# Patient Record
Sex: MALE | Race: WHITE | NOT HISPANIC OR LATINO | Employment: FULL TIME | URBAN - NONMETROPOLITAN AREA
[De-identification: names, ages, dates, MRNs, and addresses within clinical notes are randomized per-mention and may not be internally consistent; named-entity substitution may affect disease eponyms.]

---

## 2018-03-29 ENCOUNTER — OFFICE VISIT CONVERTED (OUTPATIENT)
Dept: FAMILY MEDICINE CLINIC | Age: 53
End: 2018-03-29
Attending: FAMILY MEDICINE

## 2018-08-16 ENCOUNTER — OFFICE VISIT CONVERTED (OUTPATIENT)
Dept: FAMILY MEDICINE CLINIC | Age: 53
End: 2018-08-16
Attending: FAMILY MEDICINE

## 2019-09-24 ENCOUNTER — OFFICE VISIT CONVERTED (OUTPATIENT)
Dept: FAMILY MEDICINE CLINIC | Age: 54
End: 2019-09-24
Attending: FAMILY MEDICINE

## 2019-09-26 ENCOUNTER — HOSPITAL ENCOUNTER (OUTPATIENT)
Dept: OTHER | Facility: HOSPITAL | Age: 54
Discharge: HOME | End: 2019-09-26
Attending: FAMILY MEDICINE

## 2019-09-26 LAB
ALBUMIN SERPL-MCNC: 4.5 G/DL (ref 3.5–5)
ALBUMIN/GLOB SERPL: 1.7 {RATIO} (ref 1.4–2.6)
ALP SERPL-CCNC: 56 U/L (ref 56–119)
ALT SERPL-CCNC: 47 U/L (ref 10–40)
ANION GAP SERPL CALC-SCNC: 23 MMOL/L (ref 8–19)
AST SERPL-CCNC: 39 U/L (ref 15–50)
BASOPHILS # BLD MANUAL: 0.01 10*3/UL (ref 0–0.2)
BASOPHILS NFR BLD MANUAL: 0.2 % (ref 0–3)
BILIRUB SERPL-MCNC: 0.56 MG/DL (ref 0.2–1.3)
BUN SERPL-MCNC: 20 MG/DL (ref 5–25)
BUN/CREAT SERPL: 22 {RATIO} (ref 6–20)
CALCIUM SERPL-MCNC: 10 MG/DL (ref 8.7–10.4)
CHLORIDE SERPL-SCNC: 96 MMOL/L (ref 99–111)
CHOLEST SERPL-MCNC: 174 MG/DL (ref 107–200)
CHOLEST/HDLC SERPL: 4.6 {RATIO} (ref 3–6)
CONV CO2: 22 MMOL/L (ref 22–32)
CONV TOTAL PROTEIN: 7.2 G/DL (ref 6.3–8.2)
CREAT UR-MCNC: 0.92 MG/DL (ref 0.7–1.2)
DEPRECATED RDW RBC AUTO: 41 FL
EOSINOPHIL # BLD MANUAL: 0.32 10*3/UL (ref 0–0.7)
EOSINOPHIL NFR BLD MANUAL: 5.1 % (ref 0–7)
ERYTHROCYTE [DISTWIDTH] IN BLOOD BY AUTOMATED COUNT: 12.5 % (ref 11.5–14.5)
GFR SERPLBLD BASED ON 1.73 SQ M-ARVRAT: >60 ML/MIN/{1.73_M2}
GLOBULIN UR ELPH-MCNC: 2.7 G/DL (ref 2–3.5)
GLUCOSE SERPL-MCNC: 94 MG/DL (ref 70–99)
GRANS (ABSOLUTE): 3.25 10*3/UL (ref 2–8)
GRANS: 51.8 % (ref 30–85)
HBA1C MFR BLD: 14.5 G/DL (ref 14–18)
HCT VFR BLD AUTO: 42.2 % (ref 42–52)
HDLC SERPL-MCNC: 38 MG/DL (ref 40–60)
IMM GRANULOCYTES # BLD: 0.01 10*3/UL (ref 0–0.54)
IMM GRANULOCYTES NFR BLD: 0.2 % (ref 0–0.43)
LDLC SERPL CALC-MCNC: 110 MG/DL (ref 70–100)
LYMPHOCYTES # BLD MANUAL: 2.14 10*3/UL (ref 1–5)
LYMPHOCYTES NFR BLD MANUAL: 8.5 % (ref 3–10)
MCH RBC QN AUTO: 30.5 PG (ref 27–31)
MCHC RBC AUTO-ENTMCNC: 34.4 G/DL (ref 33–37)
MCV RBC AUTO: 88.7 FL (ref 80–96)
MONOCYTES # BLD AUTO: 0.53 10*3/UL (ref 0.2–1.2)
OSMOLALITY SERPL CALC.SUM OF ELEC: 286 MOSM/KG (ref 273–304)
PLATELET # BLD AUTO: 228 10*3/UL (ref 130–400)
PMV BLD AUTO: 9.7 FL (ref 7.4–10.4)
POTASSIUM SERPL-SCNC: 4.4 MMOL/L (ref 3.5–5.3)
PSA SERPL-MCNC: 0.36 NG/ML (ref 0–4)
RBC # BLD AUTO: 4.76 10*6/UL (ref 4.7–6.1)
SODIUM SERPL-SCNC: 137 MMOL/L (ref 135–147)
TRIGL SERPL-MCNC: 131 MG/DL (ref 40–150)
TSH SERPL-ACNC: 2.2 M[IU]/L (ref 0.27–4.2)
VARIANT LYMPHS NFR BLD MANUAL: 34.2 % (ref 20–45)
VLDLC SERPL-MCNC: 26 MG/DL (ref 5–37)
WBC # BLD AUTO: 6.26 10*3/UL (ref 4.8–10.8)

## 2019-09-27 LAB
CONV HEPATITIS C AB WITH REFLEX TO CONFIRMATION: <0.1 S/CO RATIO (ref 0–0.9)
CONV HEPATITIS COMMENT: NORMAL

## 2019-11-23 ENCOUNTER — OFFICE VISIT CONVERTED (OUTPATIENT)
Dept: FAMILY MEDICINE CLINIC | Age: 54
End: 2019-11-23
Attending: FAMILY MEDICINE

## 2019-12-04 ENCOUNTER — OFFICE VISIT CONVERTED (OUTPATIENT)
Dept: FAMILY MEDICINE CLINIC | Age: 54
End: 2019-12-04
Attending: FAMILY MEDICINE

## 2020-02-22 ENCOUNTER — OFFICE VISIT CONVERTED (OUTPATIENT)
Dept: FAMILY MEDICINE CLINIC | Age: 55
End: 2020-02-22
Attending: FAMILY MEDICINE

## 2020-02-22 ENCOUNTER — HOSPITAL ENCOUNTER (OUTPATIENT)
Dept: OTHER | Facility: HOSPITAL | Age: 55
Discharge: HOME | End: 2020-02-22
Attending: FAMILY MEDICINE

## 2020-02-22 LAB
APPEARANCE UR: CLEAR
BACTERIA UR QL AUTO: ABNORMAL
BILIRUB UR QL: NEGATIVE
CASTS URNS QL MICRO: ABNORMAL /[LPF]
COLOR UR: YELLOW
CONV LEUKOCYTE ESTERASE: NEGATIVE
CONV UROBILINOGEN IN URINE BY AUTOMATED TEST STRIP: 0.2 {EHRLICHU}/DL (ref 0.1–1)
EPI CELLS #/AREA URNS HPF: ABNORMAL /[HPF]
GLUCOSE 24H UR-MCNC: NEGATIVE MG/DL
HGB UR QL STRIP: ABNORMAL
KETONES UR QL STRIP: NEGATIVE MG/DL
MUCOUS THREADS URNS QL MICRO: ABNORMAL
NITRITE UR-MCNC: NEGATIVE MG/ML
PH UR STRIP.AUTO: 6 [PH] (ref 5–8)
PROT UR-MCNC: NEGATIVE MG/DL
RBC # BLD AUTO: ABNORMAL /[HPF]
SP GR UR STRIP: 1.02 (ref 1–1.03)
SPECIMEN SOURCE: ABNORMAL
UNIDENT CRYS URNS QL MICRO: ABNORMAL /[HPF]
WBC #/AREA URNS HPF: ABNORMAL /[HPF]

## 2020-09-30 ENCOUNTER — OFFICE VISIT CONVERTED (OUTPATIENT)
Dept: FAMILY MEDICINE CLINIC | Age: 55
End: 2020-09-30
Attending: FAMILY MEDICINE

## 2020-09-30 ENCOUNTER — HOSPITAL ENCOUNTER (OUTPATIENT)
Dept: OTHER | Facility: HOSPITAL | Age: 55
Discharge: HOME | End: 2020-09-30
Attending: FAMILY MEDICINE

## 2020-09-30 LAB
ALBUMIN SERPL-MCNC: 4.9 G/DL (ref 3.5–5)
ALBUMIN/GLOB SERPL: 1.6 {RATIO} (ref 1.4–2.6)
ALP SERPL-CCNC: 57 U/L (ref 56–119)
ALT SERPL-CCNC: 70 U/L (ref 10–40)
ANION GAP SERPL CALC-SCNC: 18 MMOL/L (ref 8–19)
AST SERPL-CCNC: 45 U/L (ref 15–50)
BILIRUB SERPL-MCNC: 0.67 MG/DL (ref 0.2–1.3)
BUN SERPL-MCNC: 14 MG/DL (ref 5–25)
BUN/CREAT SERPL: 16 {RATIO} (ref 6–20)
CALCIUM SERPL-MCNC: 9.7 MG/DL (ref 8.7–10.4)
CHLORIDE SERPL-SCNC: 99 MMOL/L (ref 99–111)
CHOLEST SERPL-MCNC: 196 MG/DL (ref 107–200)
CHOLEST/HDLC SERPL: 4.7 {RATIO} (ref 3–6)
CONV CO2: 25 MMOL/L (ref 22–32)
CONV TOTAL PROTEIN: 7.9 G/DL (ref 6.3–8.2)
CREAT UR-MCNC: 0.89 MG/DL (ref 0.7–1.2)
ERYTHROCYTE [DISTWIDTH] IN BLOOD BY AUTOMATED COUNT: 12 % (ref 11.6–14.4)
GFR SERPLBLD BASED ON 1.73 SQ M-ARVRAT: >60 ML/MIN/{1.73_M2}
GLOBULIN UR ELPH-MCNC: 3 G/DL (ref 2–3.5)
GLUCOSE SERPL-MCNC: 113 MG/DL (ref 70–99)
HCT VFR BLD AUTO: 50.3 % (ref 42–52)
HDLC SERPL-MCNC: 42 MG/DL (ref 40–60)
HGB BLD-MCNC: 16.9 G/DL (ref 14–18)
LDLC SERPL CALC-MCNC: 130 MG/DL (ref 70–100)
MCH RBC QN AUTO: 30.8 PG (ref 27–31)
MCHC RBC AUTO-ENTMCNC: 33.6 G/DL (ref 33–37)
MCV RBC AUTO: 91.6 FL (ref 80–96)
OSMOLALITY SERPL CALC.SUM OF ELEC: 287 MOSM/KG (ref 273–304)
PLATELET # BLD AUTO: 173 10*3/UL (ref 130–400)
PMV BLD AUTO: 10.9 FL (ref 9.4–12.4)
POTASSIUM SERPL-SCNC: 4.4 MMOL/L (ref 3.5–5.3)
PSA SERPL-MCNC: 0.34 NG/ML (ref 0–4)
RBC # BLD AUTO: 5.49 10*6/UL (ref 4.7–6.1)
SODIUM SERPL-SCNC: 138 MMOL/L (ref 135–147)
TRIGL SERPL-MCNC: 119 MG/DL (ref 40–150)
TSH SERPL-ACNC: 1.33 M[IU]/L (ref 0.27–4.2)
VLDLC SERPL-MCNC: 24 MG/DL (ref 5–37)
WBC # BLD AUTO: 6.08 10*3/UL (ref 4.8–10.8)

## 2021-05-18 NOTE — PROGRESS NOTES
Dinesh Thornton  1965     Office/Outpatient Visit    Visit Date: Sat, Nov 23, 2019 12:05 pm    Provider: Pepe Aguilar MD (Assistant: Shirley Serna LPN)    Location: [facility]        Electronically signed by Pepe Aguilar MD on  11/24/2019 08:46:59 PM                             Subjective:        CC: Mr. Thornton is a 54 year old White male.  pt is here today for depression issues.          HPI:           Mr. Thornton presents with encounter for screening for depression.  The diagnosis of depression was made several weeks ago.  Presently, he feels a moderate degree of depression.  Currently not on any antidepressants.  Current affective symptoms include anhedonia, anxious mood, a change in appetite, altered sleep habits and decreased ability to concentrate.  The symptoms are constant and overwhelming.  Presently, Mr. Thornton denies suicidal ideation.  Recent stressors include SON MOVED AWAY FROM  HOME.      ROS:     CONSTITUTIONAL:  Negative for chills and fever.      CARDIOVASCULAR:  Negative for chest pain, palpitations and pedal edema.      RESPIRATORY:  Positive for SNORES, NEG SLEEP STUDY.   Negative for recent cough or dyspnea.      GASTROINTESTINAL:  Negative for abdominal pain, anorexia, constipation, diarrhea, nausea and vomiting.          Past Medical History / Family History / Social History:         Last Reviewed on 11/24/2019 08:42 PM by Pepe Aguilar    Past Medical History:             PREVENTIVE HEALTH MAINTENANCE             COLORECTAL CANCER SCREENING: Up to date (colonoscopy q10y; sigmoidoscopy q5y; Cologuard q3y) was last done 2014, Results are in chart; colonoscopy with normal results     DENTAL CLEANING: was last done 2019     EYE EXAM: was last done 2019     Hepatitis C Medicare Screening: was last done 2019     PSA: was last done 2018 with normal results         PAST MEDICAL HISTORY             CURRENT MEDICAL PROVIDERS:    Dermatologist         Surgical  History:         Vasectomy     R KNEE ARTHROSCOPY;         Family History:         Positive for Hypertension.      Positive for Type 2 Diabetes.          Social History:     Occupation: [employer];     Marital Status:      Children: 2 children         Tobacco/Alcohol/Supplements:     Last Reviewed on 11/24/2019 08:42 PM by Pepe Aguilar    Tobacco: He has never smoked.          Alcohol: Frequency: Daily When he drinks, the average quantity of alcohol is ONE drinks.   He typically consumes beer.      Caffeine:  He admits to consuming caffeine via coffee ( 1 serving per day ) and tea ( 1 serving per day ).          Substance Abuse History:     Last Reviewed on 11/24/2019 08:42 PM by Pepe Aguilar    NEGATIVE         Current Problems:     Last Reviewed on 11/24/2019 08:42 PM by Pepe Aguilar    Essential (primary) hypertension    HTN    Screening for ischemic heart disease    Encounter for screening for malignant neoplasm of prostate    Encounter for screening for cardiovascular disorders    Screening for prostate cancer    Screening for depression    Encounter for screening for other disorder    Rosacea    Encounter for screening for depression    Dysthymic disorder        Immunizations:     Fluvirin (4 + years dose) 10/10/2016    Fluzone (3 + years dose) 10/17/2017    Influenza Virus Vaccine, unspecified formulation 10/1/2018    Influenza, injectable, MDCK, preservative free 10/11/2019        Allergies:     Last Reviewed on 11/24/2019 08:42 PM by Pepe Aguilar    No Known Allergies.        Current Medications:     Last Reviewed on 11/24/2019 08:42 PM by Pepe Aguilar    Lisinopril 20 mg oral tablet [1 tab daily]    Atenolol 25 mg oral tablet [1 tab daily PRN]    Metronidazole 1 % Topical Cream        Objective:        Vitals:         Current: 11/23/2019 12:10:26 PM    Ht:  6 ft, 5.25 in;  Wt: 195.2 lbs;  BMI: 23.0T: 98.9 F (oral);  BP: 117/73 mm Hg (left  arm, sitting);  P: 73 bpm (left arm (BP Cuff), sitting);  sCr: 0.92 mg/dL;  GFR: 97.59        Exams:     PHYSICAL EXAM:     GENERAL: Vitals recorded well developed, well nourished;  well groomed;  no apparent distress;     NEUROLOGIC: GROSSLY INTACT     PSYCHIATRIC:  appropriate affect and demeanor; normal speech pattern; grossly normal memory;         Assessment:         F34.1   Dysthymic disorder       Z13.31   Encounter for screening for depression           ORDERS:         Meds Prescribed:       [New Rx] citalopram 20 mg oral tablet [take 1 tablet (20 mg) by oral route once daily], #30 (thirty) tablets, Refills: 2 (two)         Other Orders:         Depression screen positive and follow up plan documented  (In-House)                      Plan:         Dysthymic disorder        MEDICATIONS: NEW prescriptions an antidepressant     FOLLOW-UP: BY PHONE IN A COOUPLE OF WEEKS     DECLINES COUNSELING AT THIS TIME MIPS Positive Depression Screen: Suicide Risk Assessment completed--denies suicidal/homicidal ideation; Pharmacologic intervention initiated/modified           Prescriptions:       [New Rx] citalopram 20 mg oral tablet [take 1 tablet (20 mg) by oral route once daily], #30 (thirty) tablets, Refills: 2 (two)           Orders:         Depression screen positive and follow up plan documented  (In-House)                  Charge Capture:         Primary Diagnosis:     F34.1  Dysthymic disorder           Orders:      35276  Office/outpatient visit; established patient, level 3  (In-House)              Depression screen positive and follow up plan documented  (In-House)              Z13.31  Encounter for screening for depression

## 2021-05-18 NOTE — PROGRESS NOTES
Dinesh Thornton  1965     Office/Outpatient Visit    Visit Date: Sat, Feb 22, 2020 08:33 am    Provider: Pepe Aguilar MD (Assistant: Daly Castaneda MA)    Location: [facility]        Electronically signed by Pepe Aguilar MD on  02/22/2020 10:32:31 AM                             Subjective:        CC: Mr. Thornton is a 54 year old White male.  dysuria PT STATES HE ISNT TAKING XANAX OR SERTRALINE         HPI:           Mr. Thornton presents with dysuria.  This was first noted 1 month ago.  He denies associated decreased force of urine stream, dribbling or intermittent urine stream, hematuria, hesitancy, urgency and discharge.  Mr. Thornton states this is the first time he has experienced this kind of discomfort.        (Improving)     Concerning dysthymic disorder, the diagnosis of depression was made several weeks ago.  Presently, he feels a moderate degree of depression.  Currently not on any antidepressants.  Currently, he is doing well without any significant affective symptoms.  Recent stressors include SON MOVED AWAY FROM  HOME.  NOW OFF MEDS AND FEELS OK AT THIS TIME.      ROS:     CONSTITUTIONAL:  Negative for chills and fever.      GASTROINTESTINAL:  Negative for abdominal pain, nausea and vomiting.      MUSCULOSKELETAL:  Negative for back pain and myalgias.      NEUROLOGICAL:  Negative for headaches.          Past Medical History / Family History / Social History:         Last Reviewed on 2/22/2020 08:46 AM by Pepe Aguilar    Past Medical History:             PREVENTIVE HEALTH MAINTENANCE             COLORECTAL CANCER SCREENING: Up to date (colonoscopy q10y; sigmoidoscopy q5y; Cologuard q3y) was last done 2014, Results are in chart; colonoscopy with normal results     DENTAL CLEANING: was last done 2019     EYE EXAM: was last done 2019     Hepatitis C Medicare Screening: was last done 2019     PSA: was last done 2019 with normal results         PAST MEDICAL HISTORY              CURRENT MEDICAL PROVIDERS:    Dermatologist         Surgical History:         Vasectomy     R KNEE ARTHROSCOPY;         Family History:         Positive for Hypertension.      Positive for Type 2 Diabetes.          Social History:     Occupation: [employer];     Marital Status:      Children: 2 children         Tobacco/Alcohol/Supplements:     Last Reviewed on 2/22/2020 08:46 AM by Pepe Aguilar    Tobacco: He has never smoked.          Alcohol: Frequency: Daily When he drinks, the average quantity of alcohol is ONE drinks.   He typically consumes beer.      Caffeine:  He admits to consuming caffeine via coffee ( 1 serving per day ) and tea ( 1 serving per day ).          Substance Abuse History:     Last Reviewed on 2/22/2020 08:46 AM by Pepe Aguilar    NEGATIVE         Current Problems:     Last Reviewed on 2/22/2020 08:46 AM by Pepe Aguilar    Essential (primary) hypertension    Rosacea    Dysthymic disorder        Immunizations:     Influenza, injectable, MDCK, preservative free 10/11/2019    Fluvirin (4 + years dose) 10/10/2016    Fluzone (3 + years dose) 10/17/2017    Influenza Virus Vaccine, unspecified formulation 10/1/2018        Allergies:     Last Reviewed on 2/22/2020 08:46 AM by Pepe Aguilar    No Known Allergies.        Current Medications:     Last Reviewed on 2/22/2020 08:46 AM by Pepe Aguilar    Xanax 0.5 mg oral tablet [take 1 tablet (0.5 mg) by oral route 3 times per day PRN]    Lisinopril 20 mg oral tablet [1 tab daily]    Atenolol 25 mg oral tablet [1 tab daily PRN]    Metronidazole 1 % Topical Cream    sertraline 50 mg oral tablet [take 1 tablet (50 mg) by oral route twice daily]        Objective:        Vitals:         Current: 2/22/2020 8:36:52 AM    Ht:  6 ft, 5.25 in;  Wt: 197 lbs;  BMI: 23.2T: 97.7 F (oral);  BP: 139/89 mm Hg (left arm, sitting);  P: 71 bpm (left arm (BP Cuff), sitting);  sCr: 0.92 mg/dL;  GFR: 97.97         Exams:     PHYSICAL EXAM:     GENERAL: Vitals recorded well developed, well nourished;  well groomed;  no apparent distress;     GENITOURINARY: prostate: minimal tenderness; no prostatic nodules; S/W BOGGY;     NEUROLOGIC: GROSSLY INTACT     PSYCHIATRIC:  appropriate affect and demeanor; normal speech pattern; grossly normal memory;         Lab/Test Results:         LABORATORY RESULTS:     U/A IS CLEAR;         Assessment:         N41.0   Acute prostatitis   PROBABLE     F34.1   Dysthymic disorder   (Improving)         ORDERS:         Meds Prescribed:       [New Rx] Bactrim -160 mg oral tablet [take 1 tablet by oral route BID], #30 (thirty) tablets, Refills: 0 (zero)         Lab Orders:       [id number]  BDUAM - [facility] Urinalysis, automated, with micro  (Send-Out)                      Plan:         Acute prostatitis    LABORATORY:  Labs ordered to be performed today include urinalysis with micro.      FOLLOW-UP: Schedule follow-up appointments on a p.r.n. basis.  Observe for now Consider further workup           Prescriptions:       [New Rx] Bactrim -160 mg oral tablet [take 1 tablet by oral route BID], #30 (thirty) tablets, Refills: 0 (zero)           Orders:       [id number]  BDUAM - [facility] Urinalysis, automated, with micro  (Send-Out)              Dysthymic disorder    Observe as improved             Patient Recommendations:        For  Acute prostatitis:    Schedule follow-up appointments as needed.              Charge Capture:         Primary Diagnosis:     N41.0  Acute prostatitis           Orders:      [id number]  Office/outpatient visit; established patient, level 3  (In-House)              F34.1  Dysthymic disorder

## 2021-05-18 NOTE — PROGRESS NOTES
Dinesh Thornton  1965     Office/Outpatient Visit    Visit Date: Wed, Sep 30, 2020 08:30 am    Provider: Pepe Aguilar MD (Assistant: Angela Burton MA)    Location: [facility]        Electronically signed by Pepe Aguilar MD on  09/30/2020 09:59:04 AM                             Subjective:        CC: Mr. Thornton is a 55 year old White male.  This is a follow-up visit.  annual physical wait on flu vaccine        HPI:           Encounter for general adult medical examination without abnormal findings noted.  His last physical exam was 1 year ago.  He underwent colonoscopy 6 years ago.      Smoking Status:  Nonsmoker           PHQ-9 Depression Screening: Completed form scanned and in chart; Total Score 0     ROS:     CONSTITUTIONAL:  Negative for chills and fever.      EYES:  Negative for blurred vision and eye drainage.      E/N/T:  Negative for ear pain, diminished hearing, nasal congestion and sore throat.      CARDIOVASCULAR:  Negative for chest pain, palpitations and pedal edema.      RESPIRATORY:  Positive for SNORES, NEG SLEEP STUDY.   Negative for recent cough or dyspnea.      GASTROINTESTINAL:  Negative for abdominal pain, anorexia, constipation, diarrhea, nausea and vomiting.      GENITOURINARY:  Negative for dysuria, hematuria and impotence.      MUSCULOSKELETAL:  Negative for myalgias.      NEUROLOGICAL:  Negative for headaches.          Past Medical History / Family History / Social History:         Last Reviewed on 9/30/2020 08:38 AM by Pepe Aguilar    Past Medical History:             PREVENTIVE HEALTH MAINTENANCE             COLORECTAL CANCER SCREENING: Up to date (colonoscopy q10y; sigmoidoscopy q5y; Cologuard q3y) was last done 2014, Results are in chart; colonoscopy with normal results     DENTAL CLEANING: was last done 2019     EYE EXAM: was last done 2019     Hepatitis C Medicare Screening: was last done 2019     PSA: was last done SEPT-2019 with  normal results         PAST MEDICAL HISTORY             CURRENT MEDICAL PROVIDERS:    Dermatologist         Surgical History:         Vasectomy     R KNEE ARTHROSCOPY;         Family History:         Positive for Hypertension.      Positive for Type 2 Diabetes.          Social History:     Occupation: [employer];     Marital Status:      Children: 2 children         Tobacco/Alcohol/Supplements:     Last Reviewed on 9/30/2020 08:38 AM by Pepe Aguilar    Tobacco: He has never smoked.          Alcohol: Frequency: Daily When he drinks, the average quantity of alcohol is ONE drinks.   He typically consumes beer.      Caffeine:  He admits to consuming caffeine via coffee ( 1 serving per day ) and tea ( 1 serving per day ).          Substance Abuse History:     Last Reviewed on 9/30/2020 08:38 AM by Pepe Aguilar    NEGATIVE         Current Problems:     Last Reviewed on 9/30/2020 08:38 AM by Pepe Aguilar    Essential (primary) hypertension    Rosacea    Encounter for general adult medical examination without abnormal findings        Immunizations:     Influenza, injectable, MDCK, preservative free 10/11/2019    Fluvirin (4 + years dose) 10/10/2016    Fluzone (3 + years dose) 10/17/2017    Influenza Virus Vaccine, unspecified formulation 10/1/2018        Allergies:     Last Reviewed on 9/30/2020 08:38 AM by Pepe Aguilar    No Known Allergies.        Current Medications:     Last Reviewed on 9/30/2020 08:38 AM by Pepe Aguilar    lisinopriL 20 mg oral tablet [TAKE 1 TABLET PO QD]    Atenolol 25 mg oral tablet [1 tab daily PRN]    Metronidazole 1 % Topical Cream        Objective:        Vitals:         Current: 9/30/2020 8:36:25 AM    Ht:  6 ft, 5.25 in;  Wt: 200.4 lbs;  BMI: 23.6T: 96.6 F (temporal);  BP: 134/84 mm Hg (left arm, sitting);  P: 75 bpm (left arm (BP Cuff), sitting);  sCr: 0.92 mg/dL;  GFR: 97.56        Exams:     PHYSICAL EXAM:     GENERAL:  Vitals recorded well developed, well nourished;  well groomed;  no apparent distress;     EYES: conjunctiva and cornea are normal;     E/N/T:  normal EACs, TMs, nasal/oral mucosa, teeth, gingiva, and oropharynx;     NECK: trachea is midline; thyroid is non-palpable;     RESPIRATORY: normal respiratory rate and pattern with no distress; normal breath sounds with no rales, rhonchi, wheezes or rubs;     CARDIOVASCULAR: normal rate; rhythm is regular;  no systolic murmur; no cyanosis; no edema;     GASTROINTESTINAL: nontender, nondistended; no hepatosplenomegaly or masses; no bruits;     LYMPHATIC: no enlargement of cervical or facial nodes; no supraclavicular nodes;     MUSCULOSKELETAL: normal gait; normal overall tone     NEUROLOGIC: GROSSLY INTACT     PSYCHIATRIC:  appropriate affect and demeanor; normal speech pattern; grossly normal memory;         Assessment:         Z00.00   Encounter for general adult medical examination without abnormal findings       I10   Essential (primary) hypertension       Z13.220   Encounter for screening for lipoid disorders       Z12.5   Encounter for screening for malignant neoplasm of prostate       Z23   Encounter for immunization       Z13.31   Encounter for screening for depression           ORDERS:         Radiology/Test Orders:       3017F  Colorectal CA screen results documented and reviewed (PV)  (In-House)              Lab Orders:       [id number]  BDCB2 - [facility] CBC w/o diff  (Send-Out)            71951  COMP [facility] Comp. Metabolic Panel  (Send-Out)            49343  TSH - [facility] TSH  (Send-Out)            *  PRSAS Medicare screening PSA  (Send-Out)            23516  LPDP - [facility] Lipid Panel  (Send-Out)              Procedures Ordered:       [id number]  Immunization administration; one vaccine  (In-House)              Other Orders:       [id number]  Influenza virus vaccine, quadrivalent, split virus, preservative free 3 years of age & older  (In-House)              Depression screen  negative  (In-House)                      Plan:         Encounter for general adult medical examination without abnormal findings        COUNSELING was provided today regarding the following topics: healthy eating habits, regular exercise, testicular self-exam, use of seat belts, and ADVISED TO SEE AN EYE DOCTOR AND A DENTIST REGULARLY.      FOLLOW-UP: Schedule a follow-up visit in 12 months.  MIPS Negative Depression Screen           Orders:         Depression screen negative  (In-House)            3017F  Colorectal CA screen results documented and reviewed (PV)  (In-House)              Essential (primary) hypertension    LABORATORY:  Labs ordered to be performed today include CBC W/O DIFF, Comprehensive metabolic panel, and TSH.            Orders:       75109  BDCB2 - [facility] CBC w/o diff  (Send-Out)            07583  COMP - [facility] Comp. Metabolic Panel  (Send-Out)            77854  TSH - [facility] TSH  (Send-Out)              Encounter for screening for lipoid disorders    LABORATORY:  Labs ordered to be performed today include lipid panel.            Orders:       [id number]  LPDP - [facility] Lipid Panel  (Send-Out)              Encounter for screening for malignant neoplasm of prostate    LABORATORY:  Labs ordered to be performed today include PSA.            Orders:       *  PRSAS Medicare screening PSA  (Send-Out)              Encounter for immunization        IMMUNIZATIONS given today: Influenza Quadrivalent psv free shot 3 & up.            Immunizations:       99202  Influenza virus vaccine, quadrivalent, split virus, preservative free 3 years of age & older  (In-House)                Dose (ml): 0.5  Site: left deltoid  Route: intramuscular  Administered by: Lauren Govea          : [employer]  Lot #: [id number]  Exp: 06/30/2021          NDC: [address]-73      89691  Immunization administration; one vaccine  (In-House)                  Patient Recommendations:        For  Encounter for general adult  medical examination without abnormal findings:    Limit dietary intake of fat (especially saturated fat) and cholesterol.  Eat a variety of foods, including plenty of fruits, vegetables, and grain containg fiber, limit fat intake to 30% of total calories. Balance caloric intake with energy expended. Maintaining regular physical activity is advised to help prevent heart disease, hypertension, diabetes, and obesity.    Testicular cancer is the #1 cancer in men ages 15-35.  You should regularly examine your testicles for knots, lumps, or tenderness. Testicular pain, even if not associated with any masses, needs to be evaluated by your doctor! Always use shoulder/lap restraints when driving or riding in a vehicle, even those equipped with air bags.  Schedule a follow-up visit in 12 months.              Charge Capture:         Primary Diagnosis:     Z00.00  Encounter for general adult medical examination without abnormal findings           Orders:      84349  Preventive medicine, established patient, age 40-64 years  (In-House)              Depression screen negative  (In-House)            3017F  Colorectal CA screen results documented and reviewed (PV)  (In-House)              I10  Essential (primary) hypertension     Z13.220  Encounter for screening for lipoid disorders     Z12.5  Encounter for screening for malignant neoplasm of prostate     Z23  Encounter for immunization           Orders:      21431  Influenza virus vaccine, quadrivalent, split virus, preservative free 3 years of age & older  (In-House)            19595  Immunization administration; one vaccine  (In-House)              Z13.31  Encounter for screening for depression

## 2021-05-18 NOTE — PROGRESS NOTES
Dinesh Thornton 1965     Office/Outpatient Visit    Visit Date: Tue, Sep 24, 2019 03:33 pm    Provider: Pepe Aguilar MD (Assistant: Lauren Govea MA)    Location: [facility]        Electronically signed by Pepe Aguilar MD on  09/24/2019 05:20:00 PM                             SUBJECTIVE:        CC:     Mr. Thornton is a 54 year old White male.  yearly check up         HPI:         Patient complains of health checkup.  His last physical exam was 1 year ago.  He underwent colonoscopy 5 years ago.      Smoking Status:  Nonsmoker         PHQ-9 Depression Screening: Completed form scanned and in chart; Total Score 0     ROS:     CONSTITUTIONAL:  Negative for chills and fever.      EYES:  Negative for blurred vision and eye drainage.      E/N/T:  Negative for ear pain, diminished hearing, nasal congestion and sore throat.      CARDIOVASCULAR:  Negative for chest pain, palpitations and pedal edema.      RESPIRATORY:  Positive for SNORES, NEG SLEEP STUDY.   Negative for recent cough or dyspnea.      GASTROINTESTINAL:  Negative for abdominal pain, anorexia, constipation, diarrhea, nausea and vomiting.      GENITOURINARY:  Negative for dysuria, hematuria and impotence.      MUSCULOSKELETAL:  Negative for myalgias.      NEUROLOGICAL:  Negative for headaches.          PMH/FMH/SH:     Last Reviewed on 9/24/2019 03:40 PM by Pepe Aguilar    Past Medical History:             PREVENTIVE HEALTH MAINTENANCE             COLORECTAL CANCER SCREENING: Up to date (colonoscopy q10y; sigmoidoscopy q5y; Cologuard q3y) was last done 2014, Results are in chart; colonoscopy with normal results     PSA: was last done 2018 with normal results         Surgical History:         Vasectomy      R KNEE ARTHROSCOPY;         Family History:         Positive for Hypertension.      Positive for Type 2 Diabetes.          Social History:     Occupation: [employer];     Marital Status:      Children: 2 children          Tobacco/Alcohol/Supplements:     Last Reviewed on 9/24/2019 03:37 PM by Pepe Aguilar    Tobacco: He has never smoked.          Alcohol: Frequency: Daily When he drinks, the average quantity of alcohol is ONE drinks.   He typically consumes beer.      Caffeine:  He admits to consuming caffeine via coffee ( 1 serving per day ) and tea ( 1 serving per day ).          Substance Abuse History:     Last Reviewed on 9/24/2019 03:37 PM by Pepe Aguilar    NEGATIVE             Current Problems:     Last Reviewed on 9/24/2019 03:40 PM by Pepe Aguilar    Rosacea     HTN     Screening test for viral diseases - other     Screening for depression     Screening for ischemic heart disease     Screening for prostate cancer         Immunizations:     Fluvirin (4 + years dose) 10/10/2016     Fluzone (3 + years dose) 10/17/2017     Influenza Virus Vaccine, unspecified formulation 10/1/2018         Allergies:     Last Reviewed on 9/24/2019 03:37 PM by Pepe Aguilar      No Known Drug Allergies.         Current Medications:     Last Reviewed on 9/24/2019 03:40 PM by Pepe Aguilar    Lisinopril 20mg Tablet 1 tab daily     Atenolol 25mg Tablet 1 tab daily PRN     Metronidazole 1% Cream         OBJECTIVE:        Vitals:         Current: 9/24/2019 3:41:24 PM    Ht:  6 ft, 5.25 in;  Wt: 199.2 lbs;  BMI: 23.5    T: 97.7 F (oral);  BP: 135/88 mm Hg (left arm, sitting);  P: 75 bpm (left arm (BP Cuff), sitting);  sCr: 0.85 mg/dL;  GFR: 106.54        Exams:     PHYSICAL EXAM:     GENERAL: Vitals recorded well developed, well nourished;  well groomed;  no apparent distress;     EYES: conjunctiva and cornea are normal;     E/N/T:  normal EACs, TMs, nasal/oral mucosa, teeth, gingiva, and oropharynx;     NECK: trachea is midline; thyroid is non-palpable;     RESPIRATORY: normal respiratory rate and pattern with no distress; normal breath sounds with no rales, rhonchi, wheezes or rubs;      CARDIOVASCULAR: normal rate; rhythm is regular;  no systolic murmur; no cyanosis; no edema;     GASTROINTESTINAL: rectal exam: normal tone; no masses; no hemorrhoids;     GENITOURINARY: no testicular tenderness or masses; no inguinal hernia; prostate:  no nodules, tenderness, or enlargement;     LYMPHATIC: no enlargement of cervical or facial nodes; no supraclavicular nodes;     MUSCULOSKELETAL: normal gait; normal overall tone     NEUROLOGIC: GROSSLY INTACT     PSYCHIATRIC:  appropriate affect and demeanor; normal speech pattern; grossly normal memory;         ASSESSMENT           V70.0   Z00.00  Health checkup              DDx:     V76.44   Z12.5  Screening for prostate cancer              DDx:     V81.0   Z13.6  Screening for ischemic heart disease              DDx:     401.1   I10  HTN              DDx:     V73.89   Z11.59  Screening test for viral diseases - other              DDx:     V79.0   Z13.89  Screening for depression              DDx:         ORDERS:         Meds Prescribed:       Refill of: Atenolol 25mg Tablet 1 tab daily PRN  #12 (Twelve) tablet(s) Refills: 1         Radiology/Test Orders:       3017F  Colorectal CA screen results documented and reviewed (PV)  (In-House)           Lab Orders:       *  PRSAS Medicare screening PSA  (Send-Out)         71743  LPDP - [facility] Lipid Panel  (Send-Out)         08716  BDCBC [facility] CBC with 3 part diff  (Send-Out)         66560  COMP [facility] Comp. Metabolic Panel  (Send-Out)         30309  TSH - [facility] TSH  (Send-Out)         28334  RIBBA - [facility] Hepatitis C antibody with reflex  (Send-Out)           Other Orders:         Depression screen negative  (In-House)                   PLAN:          Health checkup         COUNSELING was provided today regarding the following topics: healthy eating habits, regular exercise, testicular self-exam, use of seat belts, and ADVISED TO SEE AN EYE DOCTOR AND A DENTIST REGULARLY.      FOLLOW-UP: Schedule a follow-up visit in 12  months.  MIPS Negative Depression Screen           Orders:         Depression screen negative  (In-House)         3017F  Colorectal CA screen results documented and reviewed (PV)  (In-House)            Screening for prostate cancer     LABORATORY:  Labs ordered to be performed today include PSA.            Orders:       *  PRSAS Medicare screening PSA  (Send-Out)            Screening for ischemic heart disease     LABORATORY:  Labs ordered to be performed today include lipid panel.            Orders:       25624  LPDP [facility] Lipid Panel  (Send-Out)            HTN     LABORATORY:  Labs ordered to be performed today include CBC, Comprehensive metabolic panel, and TSH.            Prescriptions:       Refill of: Atenolol 25mg Tablet 1 tab daily PRN  #12 (Twelve) tablet(s) Refills: 1           Orders:       80998  BDCBC [facility] CBC with 3 part diff  (Send-Out)         25630  COMP - [facility] Comp. Metabolic Panel  (Send-Out)         12548  TSH [facility] TSH  (Send-Out)            Screening test for viral diseases - other     LABORATORY:  Labs ordered to be performed today include Hepatitis C antibody with reflex.            Orders:       17014  RIBBA - [facility] Hepatitis C antibody with reflex  (Send-Out)               Patient Recommendations:        For  Health checkup:     Limit dietary intake of fat (especially saturated fat) and cholesterol.  Eat a variety of foods, including plenty of fruits, vegetables, and grain containg fiber, limit fat intake to 30% of total calories. Balance caloric intake with energy expended. Maintaining regular physical activity is advised to help prevent heart disease, hypertension, diabetes, and obesity.    Testicular cancer is the #1 cancer in men ages 15-35.  You should regularly examine your testicles for knots, lumps, or tenderness. Testicular pain, even if not associated with any masses, needs to be evaluated by your doctor! Always use shoulder/lap restraints when driving or riding in a  vehicle, even those equipped with air bags.  Schedule a follow-up visit in 12 months.              CHARGE CAPTURE           **Please note: ICD descriptions below are intended for billing purposes only and may not represent clinical diagnoses**        Primary Diagnosis:         V70.0 Health checkup            Z00.00    Encounter for general adult medical examination without abnormal findings              Orders:          31392   Preventive medicine, established patient, age 40-64 years  (In-House)                Depression screen negative  (In-House)             3017F   Colorectal CA screen results documented and reviewed (PV)  (In-House)           V76.44 Screening for prostate cancer            Z12.5    Encounter for screening for malignant neoplasm of prostate    V81.0 Screening for ischemic heart disease            Z13.6    Encounter for screening for cardiovascular disorders    401.1 HTN            I10    Essential (primary) hypertension    V73.89 Screening test for viral diseases - other            Z11.59    Encounter for screening for other viral diseases    V79.0 Screening for depression            Z13.89    Encounter for screening for other disorder

## 2021-05-18 NOTE — PROGRESS NOTES
Dinesh Thornton 1965     Office/Outpatient Visit    Visit Date: Thu, Mar 29, 2018 09:53 am    Provider: Pepe Aguilar MD (Assistant: Elise Martin MA)    Location: [facility]        Electronically signed by Pepe Aguilar MD on  03/29/2018 12:37:17 PM                             SUBJECTIVE:        CC:     Mr. Thornton is a 52 year old White male.  This is a follow-up visit.  blood pressure         HPI:         Patient to be evaluated for hTN.  His current cardiac medication regimen includes an ACE inhibitor.  He has not kept a blood pressure diary, but states that typical readings show USUALLY NORMAL BUT STILL HIGH AT TIMES..  Compliance with treatment has been good.      ROS:     CONSTITUTIONAL:  Negative for chills and fever.      CARDIOVASCULAR:  Negative for chest pain, palpitations and pedal edema.      RESPIRATORY:  Positive for SNORES, NEG SLEEP STUDY.   Negative for recent cough or dyspnea.      GASTROINTESTINAL:  Negative for abdominal pain, nausea and vomiting.      NEUROLOGICAL:  Negative for headaches.          PMH/FMH/SH:     Last Reviewed on 3/29/2018 10:11 AM by Pepe Aguilar    Surgical History:         Vasectomy      R KNEE ARTHROSCOPY; Procedures: colonoscopy 2014--NEG;;         Family History:         Positive for Hypertension.      Positive for Type 2 Diabetes.          Social History:     Occupation: [employer];     Marital Status:      Children: 2 children         Tobacco/Alcohol/Supplements:     Last Reviewed on 3/29/2018 10:10 AM by Pepe Aguilar    Tobacco: He has never smoked.          Substance Abuse History:     Last Reviewed on 3/29/2018 10:10 AM by Pepe Aguilar    NEGATIVE             Current Problems:     Last Reviewed on 3/29/2018 10:11 AM by Pepe Aguilar    HTN         Immunizations:     Fluvirin (4 + years dose) 10/10/2016     Fluzone (3 + years dose) 10/17/2017         Allergies:     Last Reviewed on  3/29/2018 10:11 AM by Pepe Aguilar      No Known Drug Allergies.         Current Medications:     Last Reviewed on 3/29/2018 10:11 AM by Pepe Aguilar    Lisinopril 20mg Tablet 1 tab daily         OBJECTIVE:        Vitals:         Current: 3/29/2018 9:54:12 AM    Ht:  6 ft, 5.25 in;  Wt: 192.4 lbs;  BMI: 22.7    T: 97.3 F (oral);  BP: 153/89 mm Hg (left arm, sitting);  P: 109 bpm (left arm (BP Cuff), sitting);  sCr: 0.81 mg/dL;  GFR: 112.68        Repeat: 134/86 BY ME          Exams:     PHYSICAL EXAM:     GENERAL: Vitals recorded well developed, well nourished;  well groomed;  no apparent distress;     NECK: trachea is midline; thyroid is non-palpable;     RESPIRATORY: normal respiratory rate and pattern with no distress; normal breath sounds with no rales, rhonchi, wheezes or rubs;     CARDIOVASCULAR: normal rate; rhythm is regular;  no systolic murmur; no cyanosis; no edema;     LYMPHATIC: no enlargement of cervical or facial nodes;     NEUROLOGIC: GROSSLY INTACT     PSYCHIATRIC:  appropriate affect and demeanor; normal speech pattern; grossly normal memory;         ASSESSMENT           401.1   I10  HTN              DDx:         ORDERS:         Meds Prescribed:       Atenolol 25mg Tablet 1 tab daily PRN  #12 (Twelve) tablet(s) Refills: 1                 PLAN:          HTN         MEDICATIONS: I have prescribed a beta blocker.      RECOMMENDATIONS given include: perform routine monitoring of blood pressure with home blood pressure cuff.      FOLLOW-UP: Schedule a follow-up visit in 6 months.      WILL ADD A PRN BETA BLOCKER TO USE NLY WHEN BP IS HIGH.            Prescriptions:       Atenolol 25mg Tablet 1 tab daily PRN  #12 (Twelve) tablet(s) Refills: 1             Patient Recommendations:        For  HTN:     Begin monitoring your blood pressure by brief nurse visits at our office, a home blood pressure monitor, or by checking on the machines in pharmacies or stores.  Keep a log of the  readings.  Schedule a follow-up visit in 6 months.              CHARGE CAPTURE           **Please note: ICD descriptions below are intended for billing purposes only and may not represent clinical diagnoses**        Primary Diagnosis:         401.1 HTN            I10    Essential (primary) hypertension              Orders:          13387   Office/outpatient visit; established patient, level 3  (In-House)

## 2021-05-18 NOTE — PROGRESS NOTES
Dinesh Thornton 1965     Office/Outpatient Visit    Visit Date: Thu, Aug 16, 2018 09:05 am    Provider: Pepe Aguilar MD (Assistant: Rachelle Valle LPN)    Location: [facility]        Electronically signed by Pepe Aguilar MD on  08/16/2018 09:34:00 AM                             SUBJECTIVE:        HPI:         Patient to be evaluated for health checkup.  His last physical exam was 1 year ago.  He underwent colonoscopy 4 years ago with normal results.  He performs testicular self-exams regularly.      Smoking Status:  Nonsmoker         PHQ-9 Depression Screening: Completed form scanned and in chart; Total Score 0 Alcohol Consumption Screening: Completed form scanned and in chart; Total Score 6     ROS:     CONSTITUTIONAL:  Negative for chills and fever.      EYES:  Negative for blurred vision and eye drainage.      E/N/T:  Negative for ear pain, diminished hearing, nasal congestion and sore throat.      CARDIOVASCULAR:  Negative for chest pain, palpitations and pedal edema.      RESPIRATORY:  Positive for SNORES, NEG SLEEP STUDY.   Negative for recent cough or dyspnea.      GASTROINTESTINAL:  Negative for abdominal pain, anorexia, constipation, diarrhea, nausea and vomiting.      GENITOURINARY:  Negative for dysuria, hematuria and impotence.      MUSCULOSKELETAL:  Negative for myalgias.      NEUROLOGICAL:  Negative for headaches.          PMH/FMH/SH:     Last Reviewed on 8/16/2018 09:04 AM by Pepe Aguilar    Surgical History:         Vasectomy      R KNEE ARTHROSCOPY; Procedures: colonoscopy 2014--NEG;;         Family History:         Positive for Hypertension.      Positive for Type 2 Diabetes.          Social History:     Occupation: [employer];     Marital Status:      Children: 2 children         Tobacco/Alcohol/Supplements:     Last Reviewed on 8/16/2018 09:06 AM by Rachelle Valle    Tobacco: He has never smoked.          Alcohol: Frequency: Daily When he  drinks, the average quantity of alcohol is ONE drinks.   He typically consumes beer.      Caffeine:  He admits to consuming caffeine via coffee ( 1 serving per day ) and tea ( 1 serving per day ).          Substance Abuse History:     Last Reviewed on 8/16/2018 09:04 AM by Pepe Aguilar    NEGATIVE             Current Problems:     Last Reviewed on 8/16/2018 09:05 AM by Pepe Aguilar    HTN     Screening for depression     Screening for ischemic heart disease     Screening for prostate cancer         Immunizations:     Fluvirin (4 + years dose) 10/10/2016     Fluzone (3 + years dose) 10/17/2017         Allergies:     Last Reviewed on 8/16/2018 09:06 AM by Rachelle Valle      No Known Drug Allergies.         Current Medications:     Last Reviewed on 8/16/2018 09:05 AM by Pepe Aguilar    Atenolol 25mg Tablet 1 tab daily PRN     Lisinopril 20mg Tablet 1 tab daily         OBJECTIVE:        Vitals:         Current: 8/16/2018 9:07:42 AM    Ht:  6 ft, 5.25 in;  Wt: 195 lbs;  BMI: 23.0    T: 98.2 F (oral);  BP: 135/80 mm Hg (left arm, sitting);  P: 71 bpm (left arm (BP Cuff), sitting);  sCr: 0.81 mg/dL;  GFR: 112.06    O2 Sat: 99 %        Exams:     PHYSICAL EXAM:     GENERAL: Vitals recorded well developed, well nourished;  well groomed;  no apparent distress;     EYES: conjunctiva and cornea are normal;     E/N/T:  normal EACs, TMs, nasal/oral mucosa, teeth, gingiva, and oropharynx;     NECK: trachea is midline; thyroid is non-palpable;     RESPIRATORY: normal respiratory rate and pattern with no distress; normal breath sounds with no rales, rhonchi, wheezes or rubs;     CARDIOVASCULAR: normal rate; rhythm is regular;  no systolic murmur; no cyanosis; no edema;     GASTROINTESTINAL: rectal exam: normal tone; no masses; no hemorrhoids;     GENITOURINARY: no testicular tenderness or masses; no inguinal hernia; prostate:  no nodules, tenderness, or enlargement;     LYMPHATIC: no enlargement  of cervical or facial nodes;     MUSCULOSKELETAL: normal gait; normal overall tone     NEUROLOGIC: GROSSLY INTACT     PSYCHIATRIC:  appropriate affect and demeanor; normal speech pattern; grossly normal memory;         ASSESSMENT           V70.0   Z00.00  Health checkup              DDx:     V76.44   Z12.5  Screening for prostate cancer              DDx:     V81.0   Z13.6  Screening for ischemic heart disease              DDx:     401.1   I10  HTN              DDx:     V79.0   Z13.89  Screening for depression              DDx:         ORDERS:         Meds Prescribed:       Refill of: Lisinopril 20mg Tablet 1 tab daily  #90 (Ninety) tablet(s) Refills: 3         Lab Orders:       29962  [facility] CBC with 3 part diff  (Send-Out)         03268  [facility] Comp. Metabolic Panel  (Send-Out)         49893  [facility] Lipid Panel  (Send-Out)         *  PRSAS Medicare screening PSA  (Send-Out)         18590  TSH [facility] TSH  (Send-Out)           Other Orders:         Depression screen negative  (In-House)                   PLAN:          Health checkup     LABORATORY:  Labs ordered to be performed today include CBC, Comprehensive metabolic panel, lipid panel, PSA, and TSH.      RECOMMENDATIONS given include: need for yearly flu shots.  MIPS Negative Depression Screen     COUNSELING was provided today regarding the following topics: healthy eating habits, regular exercise, testicular self-exam, use of seat belts, and ADVISED TO SEE AN EYE DOCTOR AND A DENTIST REGULARLY.      FOLLOW-UP: Schedule a follow-up visit in 12 months.            Orders:       94954  [facility] CBC with 3 part diff  (Send-Out)         94736  [facility] Comp. Metabolic Panel  (Send-Out)         60043  [facility] Lipid Panel  (Send-Out)         *  [facility]  [facility])         58808  TSH [facility] TSH  (Send-Out)           Depression screen negative  (In-House)            HTN           Prescriptions:       Refill of:  Lisinopril 20mg Tablet 1 tab daily  #90 (Ninety) tablet(s) Refills: 3             Patient Recommendations:        For  Health checkup:     Limit dietary intake of fat (especially saturated fat) and cholesterol.  Eat a variety of foods, including plenty of fruits, vegetables, and grain containg fiber, limit fat intake to 30% of total calories. Balance caloric intake with energy expended. Maintaining regular physical activity is advised to help prevent heart disease, hypertension, diabetes, and obesity.    Testicular cancer is the #1 cancer in men ages 15-35.  You should regularly examine your testicles for knots, lumps, or tenderness. Testicular pain, even if not associated with any masses, needs to be evaluated by your doctor! Always use shoulder/lap restraints when driving or riding in a vehicle, even those equipped with air bags.  Schedule a follow-up visit in 12 months.  Obtain a flu shot each year.              CHARGE CAPTURE           **Please note: ICD descriptions below are intended for billing purposes only and may not represent clinical diagnoses**        Primary Diagnosis:         V70.0 Health checkup            Z00.00    Encntr for general adult medical exam w/o abnormal findings              Orders:          18094   Preventive medicine, established patient, age 40-64 years  (In-House)                Depression screen negative  (In-House)           V76.44 Screening for prostate cancer            Z12.5    Encounter for screening for malignant neoplasm of prostate    V81.0 Screening for ischemic heart disease            Z13.6    Encounter for screening for cardiovascular disorders    401.1 HTN            I10    Essential (primary) hypertension    V79.0 Screening for depression            Z13.89    Encounter for screening for other disorder        ADDENDUMS:      ____________________________________    Addendum: 08/27/2018 10:12 PM - Pepe Aguilar        CC:  PHYSICAL

## 2021-07-01 VITALS
BODY MASS INDEX: 23.52 KG/M2 | WEIGHT: 199.2 LBS | DIASTOLIC BLOOD PRESSURE: 88 MMHG | HEART RATE: 75 BPM | SYSTOLIC BLOOD PRESSURE: 135 MMHG | TEMPERATURE: 97.7 F | HEIGHT: 77 IN

## 2021-07-01 VITALS
TEMPERATURE: 98.7 F | SYSTOLIC BLOOD PRESSURE: 122 MMHG | HEART RATE: 101 BPM | DIASTOLIC BLOOD PRESSURE: 70 MMHG | BODY MASS INDEX: 22.76 KG/M2 | WEIGHT: 192.8 LBS | HEIGHT: 77 IN

## 2021-07-01 VITALS
SYSTOLIC BLOOD PRESSURE: 153 MMHG | WEIGHT: 192.4 LBS | BODY MASS INDEX: 22.72 KG/M2 | TEMPERATURE: 97.3 F | HEIGHT: 77 IN | DIASTOLIC BLOOD PRESSURE: 89 MMHG | HEART RATE: 109 BPM

## 2021-07-01 VITALS
HEART RATE: 71 BPM | HEIGHT: 77 IN | WEIGHT: 195 LBS | DIASTOLIC BLOOD PRESSURE: 80 MMHG | TEMPERATURE: 98.2 F | OXYGEN SATURATION: 99 % | BODY MASS INDEX: 23.02 KG/M2 | SYSTOLIC BLOOD PRESSURE: 135 MMHG

## 2021-07-01 VITALS
HEIGHT: 77 IN | WEIGHT: 195.2 LBS | BODY MASS INDEX: 23.05 KG/M2 | DIASTOLIC BLOOD PRESSURE: 73 MMHG | SYSTOLIC BLOOD PRESSURE: 117 MMHG | TEMPERATURE: 98.9 F | HEART RATE: 73 BPM

## 2021-07-02 VITALS
BODY MASS INDEX: 23.26 KG/M2 | TEMPERATURE: 97.7 F | HEIGHT: 77 IN | DIASTOLIC BLOOD PRESSURE: 89 MMHG | SYSTOLIC BLOOD PRESSURE: 139 MMHG | HEART RATE: 71 BPM | WEIGHT: 197 LBS

## 2021-07-02 VITALS
TEMPERATURE: 96.6 F | BODY MASS INDEX: 23.66 KG/M2 | SYSTOLIC BLOOD PRESSURE: 134 MMHG | HEIGHT: 77 IN | HEART RATE: 75 BPM | DIASTOLIC BLOOD PRESSURE: 84 MMHG | WEIGHT: 200.4 LBS

## 2021-07-18 RX ORDER — LISINOPRIL 20 MG/1
TABLET ORAL
Qty: 90 TABLET | Refills: 3 | Status: SHIPPED | OUTPATIENT
Start: 2021-07-18 | End: 2021-07-20 | Stop reason: SDUPTHER

## 2021-07-20 DIAGNOSIS — I10 ESSENTIAL HYPERTENSION: Primary | ICD-10-CM

## 2021-07-20 RX ORDER — LISINOPRIL 20 MG/1
20 TABLET ORAL DAILY
Qty: 90 TABLET | Refills: 1 | Status: SHIPPED | OUTPATIENT
Start: 2021-07-20 | End: 2021-10-07 | Stop reason: SDUPTHER

## 2021-10-07 ENCOUNTER — OFFICE VISIT (OUTPATIENT)
Dept: FAMILY MEDICINE CLINIC | Age: 56
End: 2021-10-07

## 2021-10-07 ENCOUNTER — LAB (OUTPATIENT)
Dept: LAB | Facility: HOSPITAL | Age: 56
End: 2021-10-07

## 2021-10-07 VITALS
HEIGHT: 77 IN | DIASTOLIC BLOOD PRESSURE: 80 MMHG | HEART RATE: 77 BPM | TEMPERATURE: 98.1 F | SYSTOLIC BLOOD PRESSURE: 121 MMHG | BODY MASS INDEX: 23.09 KG/M2 | WEIGHT: 195.6 LBS

## 2021-10-07 DIAGNOSIS — Z00.00 ANNUAL VISIT FOR GENERAL ADULT MEDICAL EXAMINATION WITHOUT ABNORMAL FINDINGS: Primary | ICD-10-CM

## 2021-10-07 DIAGNOSIS — F41.9 ANXIETY: ICD-10-CM

## 2021-10-07 DIAGNOSIS — Z12.5 SCREENING FOR PROSTATE CANCER: ICD-10-CM

## 2021-10-07 DIAGNOSIS — I10 ESSENTIAL HYPERTENSION: ICD-10-CM

## 2021-10-07 DIAGNOSIS — Z13.220 SCREENING, LIPID: ICD-10-CM

## 2021-10-07 PROBLEM — L71.9 ROSACEA: Status: ACTIVE | Noted: 2021-10-07

## 2021-10-07 LAB
ALBUMIN SERPL-MCNC: 5.1 G/DL (ref 3.5–5.2)
ALBUMIN/GLOB SERPL: 1.9 G/DL
ALP SERPL-CCNC: 51 U/L (ref 39–117)
ALT SERPL W P-5'-P-CCNC: 51 U/L (ref 1–41)
ANION GAP SERPL CALCULATED.3IONS-SCNC: 7.5 MMOL/L (ref 5–15)
AST SERPL-CCNC: 34 U/L (ref 1–40)
BILIRUB SERPL-MCNC: 0.5 MG/DL (ref 0–1.2)
BUN SERPL-MCNC: 11 MG/DL (ref 6–20)
BUN/CREAT SERPL: 13.1 (ref 7–25)
CALCIUM SPEC-SCNC: 9.9 MG/DL (ref 8.6–10.5)
CHLORIDE SERPL-SCNC: 102 MMOL/L (ref 98–107)
CHOLEST SERPL-MCNC: 170 MG/DL (ref 0–200)
CO2 SERPL-SCNC: 30.5 MMOL/L (ref 22–29)
CREAT SERPL-MCNC: 0.84 MG/DL (ref 0.76–1.27)
GFR SERPL CREATININE-BSD FRML MDRD: 94 ML/MIN/1.73
GLOBULIN UR ELPH-MCNC: 2.7 GM/DL
GLUCOSE SERPL-MCNC: 113 MG/DL (ref 65–99)
HDLC SERPL-MCNC: 35 MG/DL (ref 40–60)
LDLC SERPL CALC-MCNC: 111 MG/DL (ref 0–100)
LDLC/HDLC SERPL: 3.09 {RATIO}
POTASSIUM SERPL-SCNC: 5.1 MMOL/L (ref 3.5–5.2)
PROT SERPL-MCNC: 7.8 G/DL (ref 6–8.5)
PSA SERPL-MCNC: 0.36 NG/ML (ref 0–4)
SODIUM SERPL-SCNC: 140 MMOL/L (ref 136–145)
TRIGL SERPL-MCNC: 135 MG/DL (ref 0–150)
VLDLC SERPL-MCNC: 24 MG/DL (ref 5–40)

## 2021-10-07 PROCEDURE — G0103 PSA SCREENING: HCPCS | Performed by: FAMILY MEDICINE

## 2021-10-07 PROCEDURE — 36415 COLL VENOUS BLD VENIPUNCTURE: CPT | Performed by: FAMILY MEDICINE

## 2021-10-07 PROCEDURE — 99396 PREV VISIT EST AGE 40-64: CPT | Performed by: FAMILY MEDICINE

## 2021-10-07 PROCEDURE — 80053 COMPREHEN METABOLIC PANEL: CPT | Performed by: FAMILY MEDICINE

## 2021-10-07 PROCEDURE — 80061 LIPID PANEL: CPT | Performed by: FAMILY MEDICINE

## 2021-10-07 RX ORDER — LISINOPRIL 20 MG/1
20 TABLET ORAL DAILY
Qty: 90 TABLET | Refills: 3 | Status: SHIPPED | OUTPATIENT
Start: 2021-10-07 | End: 2022-10-04 | Stop reason: SDUPTHER

## 2021-10-07 RX ORDER — ATENOLOL 25 MG/1
25 TABLET ORAL DAILY PRN
Qty: 30 TABLET | Refills: 0 | Status: SHIPPED | OUTPATIENT
Start: 2021-10-07

## 2021-10-07 RX ORDER — MULTIPLE VITAMINS W/ MINERALS TAB 9MG-400MCG
1 TAB ORAL DAILY
COMMUNITY

## 2021-10-07 NOTE — ASSESSMENT & PLAN NOTE
ADVICE GIVEN RE:  ALCOHOL USE, SEATBELT USE, REGULAR EXERCISE, HEALTHY DIET, ROUTINE EYE AND DENTAL EXAMS.  WILL CHECK SOME ROUTINE LABS AND REFILL MEDS.

## 2021-10-07 NOTE — PROGRESS NOTES
Chief Complaint  Annual Exam    Subjective          Dinesh Thornton presents to [facility]  --ANNUAL EXAM, LAST EXAM ONE YEAR AGO.  NEVER SMOKED.  LAST COLONOSCOPY WAS SEVEN YEARS AGO.  ON BP MEDS, ROSACEA CREAM AND STILL TAKES AN OCCASIONAL BETA BLOCKER FOR ANXIETY.          No Known Allergies     Health Maintenance Due   Topic Date Due   • COLORECTAL CANCER SCREENING  Never done   • ANNUAL PHYSICAL  Never done   • COVID-19 Vaccine (1) Never done   • TDAP/TD VACCINES (1 - Tdap) Never done   • ZOSTER VACCINE (1 of 2) Never done   • HEPATITIS C SCREENING  Never done   • INFLUENZA VACCINE  Never done        Current Outpatient Medications on File Prior to Visit   Medication Sig   • Cyanocobalamin (VITAMIN B12 PO) Take  by mouth.   • metroNIDAZOLE (METROCREAM) 0.75 % cream Apply 1 application topically to the appropriate area as directed 2 (Two) Times a Day.   • multivitamin with minerals tablet tablet Take 1 tablet by mouth Daily.   • [DISCONTINUED] lisinopril (PRINIVIL,ZESTRIL) 20 MG tablet Take 1 tablet by mouth Daily.     No current facility-administered medications on file prior to visit.         There is no immunization history on file for this patient.    Review of Systems   Constitutional: Negative for activity change, appetite change, chills, fatigue and fever.   HENT: Negative for congestion, ear pain, hearing loss, rhinorrhea and sore throat.    Eyes: Negative for blurred vision and discharge.   Respiratory: Negative for cough and shortness of breath.    Cardiovascular: Negative for chest pain, palpitations and leg swelling.   Gastrointestinal: Negative for abdominal pain, constipation, diarrhea, nausea and vomiting.   Genitourinary: Negative for dysuria and hematuria.   Musculoskeletal: Negative for arthralgias and myalgias.   Neurological: Negative for headache.   Psychiatric/Behavioral: Negative for depressed mood.        Objective     /80   Pulse 77   Temp 98.1 °F  "(36.7 °C)   Ht 196.2 cm (77.24\")   Wt 88.7 kg (195 lb 9.6 oz)   BMI 23.05 kg/m²       Physical Exam  Vitals and nursing note reviewed.   Constitutional:       General: He is not in acute distress.     Appearance: Normal appearance.   HENT:      Right Ear: Tympanic membrane normal.      Left Ear: Tympanic membrane normal.      Mouth/Throat:      Pharynx: Oropharynx is clear.   Eyes:      Conjunctiva/sclera: Conjunctivae normal.   Cardiovascular:      Rate and Rhythm: Normal rate and regular rhythm.      Heart sounds: Normal heart sounds. No murmur heard.     Pulmonary:      Effort: Pulmonary effort is normal.      Breath sounds: Normal breath sounds.   Abdominal:      General: Bowel sounds are normal.      Palpations: Abdomen is soft.      Tenderness: There is no abdominal tenderness.   Musculoskeletal:      Cervical back: Neck supple.      Right lower leg: No edema.      Left lower leg: No edema.   Lymphadenopathy:      Cervical: No cervical adenopathy.   Neurological:      General: No focal deficit present.      Mental Status: He is alert.      Cranial Nerves: No cranial nerve deficit.      Coordination: Coordination normal.      Gait: Gait normal.   Psychiatric:         Mood and Affect: Mood normal.         Behavior: Behavior normal.         Result Review :                             Assessment and Plan      Diagnoses and all orders for this visit:    1. Annual visit for general adult medical examination without abnormal findings (Primary)  Assessment & Plan:  ADVICE GIVEN RE:  ALCOHOL USE, SEATBELT USE, REGULAR EXERCISE, HEALTHY DIET, ROUTINE EYE AND DENTAL EXAMS.  WILL CHECK SOME ROUTINE LABS AND REFILL MEDS.        2. Essential hypertension  -     Comprehensive Metabolic Panel  -     lisinopril (PRINIVIL,ZESTRIL) 20 MG tablet; Take 1 tablet by mouth Daily.  Dispense: 90 tablet; Refill: 3    3. Screening for prostate cancer  -     PSA Screen    4. Screening, lipid  -     Lipid Panel    5. Anxiety (mild)   -  "    atenolol (Tenormin) 25 MG tablet; Take 1 tablet by mouth Daily As Needed (ANXIETY).  Dispense: 30 tablet; Refill: 0          Follow Up     Return in about 1 year (around 10/7/2022).    Patient was given instructions and counseling regarding his condition or for health maintenance advice. Please see specific information pulled into the AVS if appropriate.

## 2022-10-04 DIAGNOSIS — I10 ESSENTIAL HYPERTENSION: ICD-10-CM

## 2022-10-04 RX ORDER — LISINOPRIL 20 MG/1
TABLET ORAL
Qty: 90 TABLET | Refills: 3 | Status: SHIPPED | OUTPATIENT
Start: 2022-10-04 | End: 2022-10-17 | Stop reason: SDUPTHER

## 2022-10-04 NOTE — TELEPHONE ENCOUNTER
Rx Refill Note  Requested Prescriptions     Pending Prescriptions Disp Refills   • lisinopril (PRINIVIL,ZESTRIL) 20 MG tablet [Pharmacy Med Name: lisinopril 20 mg tablet] 90 tablet 3     Sig: Take 1 tablet BY MOUTH EVERY DAY      Last office visit with prescribing clinician: 10/7/2021      Next office visit with prescribing clinician: 10/17/2022  Comprehensive Metabolic Panel (10/07/2021 10:06)    Rachelle Valle LPN  10/04/22, 14:45 EDT

## 2022-10-17 ENCOUNTER — LAB (OUTPATIENT)
Dept: LAB | Facility: HOSPITAL | Age: 57
End: 2022-10-17

## 2022-10-17 ENCOUNTER — OFFICE VISIT (OUTPATIENT)
Dept: FAMILY MEDICINE CLINIC | Age: 57
End: 2022-10-17

## 2022-10-17 VITALS
WEIGHT: 182.8 LBS | BODY MASS INDEX: 21.58 KG/M2 | HEART RATE: 69 BPM | HEIGHT: 77 IN | SYSTOLIC BLOOD PRESSURE: 132 MMHG | DIASTOLIC BLOOD PRESSURE: 81 MMHG

## 2022-10-17 DIAGNOSIS — Z12.5 SCREENING FOR PROSTATE CANCER: ICD-10-CM

## 2022-10-17 DIAGNOSIS — I10 ESSENTIAL HYPERTENSION: ICD-10-CM

## 2022-10-17 DIAGNOSIS — Z13.220 SCREENING FOR LIPOID DISORDERS: ICD-10-CM

## 2022-10-17 DIAGNOSIS — F41.9 ANXIETY: ICD-10-CM

## 2022-10-17 DIAGNOSIS — Z00.00 ANNUAL PHYSICAL EXAM: Primary | ICD-10-CM

## 2022-10-17 LAB
ALBUMIN SERPL-MCNC: 4.7 G/DL (ref 3.5–5.2)
ALBUMIN/GLOB SERPL: 1.9 G/DL
ALP SERPL-CCNC: 53 U/L (ref 39–117)
ALT SERPL W P-5'-P-CCNC: 27 U/L (ref 1–41)
ANION GAP SERPL CALCULATED.3IONS-SCNC: 6.4 MMOL/L (ref 5–15)
AST SERPL-CCNC: 25 U/L (ref 1–40)
BILIRUB SERPL-MCNC: 0.6 MG/DL (ref 0–1.2)
BUN SERPL-MCNC: 14 MG/DL (ref 6–20)
BUN/CREAT SERPL: 17.9 (ref 7–25)
CALCIUM SPEC-SCNC: 9.5 MG/DL (ref 8.6–10.5)
CHLORIDE SERPL-SCNC: 102 MMOL/L (ref 98–107)
CHOLEST SERPL-MCNC: 155 MG/DL (ref 0–200)
CO2 SERPL-SCNC: 28.6 MMOL/L (ref 22–29)
CREAT SERPL-MCNC: 0.78 MG/DL (ref 0.76–1.27)
EGFRCR SERPLBLD CKD-EPI 2021: 103.4 ML/MIN/1.73
GLOBULIN UR ELPH-MCNC: 2.5 GM/DL
GLUCOSE SERPL-MCNC: 108 MG/DL (ref 65–99)
HDLC SERPL-MCNC: 44 MG/DL (ref 40–60)
LDLC SERPL CALC-MCNC: 97 MG/DL (ref 0–100)
LDLC/HDLC SERPL: 2.2 {RATIO}
POTASSIUM SERPL-SCNC: 4.8 MMOL/L (ref 3.5–5.2)
PROT SERPL-MCNC: 7.2 G/DL (ref 6–8.5)
PSA SERPL-MCNC: 0.39 NG/ML (ref 0–4)
SODIUM SERPL-SCNC: 137 MMOL/L (ref 136–145)
TRIGL SERPL-MCNC: 71 MG/DL (ref 0–150)
TSH SERPL DL<=0.05 MIU/L-ACNC: 1.06 UIU/ML (ref 0.27–4.2)
VLDLC SERPL-MCNC: 14 MG/DL (ref 5–40)

## 2022-10-17 PROCEDURE — 84443 ASSAY THYROID STIM HORMONE: CPT | Performed by: FAMILY MEDICINE

## 2022-10-17 PROCEDURE — 80053 COMPREHEN METABOLIC PANEL: CPT | Performed by: FAMILY MEDICINE

## 2022-10-17 PROCEDURE — 99396 PREV VISIT EST AGE 40-64: CPT | Performed by: FAMILY MEDICINE

## 2022-10-17 PROCEDURE — G0103 PSA SCREENING: HCPCS | Performed by: FAMILY MEDICINE

## 2022-10-17 PROCEDURE — 36415 COLL VENOUS BLD VENIPUNCTURE: CPT | Performed by: FAMILY MEDICINE

## 2022-10-17 PROCEDURE — 80061 LIPID PANEL: CPT | Performed by: FAMILY MEDICINE

## 2022-10-17 RX ORDER — LORAZEPAM 1 MG/1
1 TABLET ORAL DAILY PRN
Qty: 12 TABLET | Refills: 0 | Status: SHIPPED | OUTPATIENT
Start: 2022-10-17

## 2022-10-17 RX ORDER — LISINOPRIL 20 MG/1
20 TABLET ORAL DAILY
Qty: 90 TABLET | Refills: 3 | Status: SHIPPED | OUTPATIENT
Start: 2022-10-17

## 2022-10-17 NOTE — PROGRESS NOTES
Chief Complaint  Annual Exam    Subjective          Dinesh Thornton presents to [facility]  History of Present Illness  --ANNUAL EXAM, LAST EXAM ONE YEAR AGO, DOES OT SMOKE, COLONOSCOPY WAS IN 2014  --TOLERATING BLOOD PRESSURE MEDICATION WITHOUT APPARENT SIDE EFFECTS  --STILL TAKES AN OCCASIONAL ATIVAN FOR SOCIAL ANXIETY, NO RECURRENCE OF THE PRIOR DEPRESSON  --DUE FOR SOME ROUTINE LABS AND A FLU SHOT         No Known Allergies     Health Maintenance Due   Topic Date Due   • HEPATITIS C SCREENING  Never done        Current Outpatient Medications on File Prior to Visit   Medication Sig   • atenolol (Tenormin) 25 MG tablet Take 1 tablet by mouth Daily As Needed (ANXIETY).   • Cyanocobalamin (VITAMIN B12 PO) Take  by mouth.   • metroNIDAZOLE (METROCREAM) 0.75 % cream Apply 1 application topically to the appropriate area as directed 2 (Two) Times a Day.   • multivitamin with minerals tablet tablet Take 1 tablet by mouth Daily.   • [DISCONTINUED] lisinopril (PRINIVIL,ZESTRIL) 20 MG tablet Take 1 tablet BY MOUTH EVERY DAY     No current facility-administered medications on file prior to visit.       Immunization History   Administered Date(s) Administered   • FluLaval/Fluzone >6mos 10/15/2021   • Influenza, Unspecified 10/17/2017       Review of Systems   Constitutional: Negative for activity change, appetite change, chills, fatigue and fever.   HENT: Negative for congestion, ear pain, hearing loss, rhinorrhea and sore throat.    Eyes: Negative for blurred vision and discharge.   Respiratory: Negative for cough and shortness of breath.    Cardiovascular: Negative for chest pain, palpitations and leg swelling.   Gastrointestinal: Negative for abdominal pain, constipation, diarrhea, nausea and vomiting.   Genitourinary: Negative for dysuria and hematuria.   Musculoskeletal: Negative for arthralgias and myalgias.   Neurological: Negative for headache.   Psychiatric/Behavioral: Negative for  "depressed mood.        Objective     /81 (BP Location: Left arm, Patient Position: Sitting)   Pulse 69   Ht 196.2 cm (77.25\")   Wt 82.9 kg (182 lb 12.8 oz)   BMI 21.54 kg/m²       Physical Exam  Vitals and nursing note reviewed.   Constitutional:       General: He is not in acute distress.     Appearance: Normal appearance.   HENT:      Right Ear: Tympanic membrane normal.      Left Ear: Tympanic membrane normal.      Mouth/Throat:      Pharynx: Oropharynx is clear.   Eyes:      Conjunctiva/sclera: Conjunctivae normal.   Cardiovascular:      Rate and Rhythm: Normal rate and regular rhythm.      Heart sounds: Normal heart sounds. No murmur heard.  Pulmonary:      Effort: Pulmonary effort is normal.      Breath sounds: Normal breath sounds.   Abdominal:      General: Bowel sounds are normal.      Palpations: Abdomen is soft.      Tenderness: There is no abdominal tenderness.   Musculoskeletal:      Cervical back: Neck supple.      Right lower leg: No edema.      Left lower leg: No edema.   Lymphadenopathy:      Cervical: No cervical adenopathy.   Neurological:      General: No focal deficit present.      Mental Status: He is alert.      Cranial Nerves: No cranial nerve deficit.      Coordination: Coordination normal.      Gait: Gait normal.   Psychiatric:         Mood and Affect: Mood normal.         Behavior: Behavior normal.         Result Review :                             Assessment and Plan      Diagnoses and all orders for this visit:    1. Annual physical exam (Primary)  Assessment & Plan:  ADVICE GIVEN RE:  SEATBELT USE, ALCOHOL USE, HEALTHY DIET, ROUTINE EYE AND DENTAL EXAM, ROUTINE VACCINATIONS.    HE WILL GET HIS FLU SHOT LATER       2. Screening for prostate cancer  -     PSA Screen    3. Screening for lipoid disorders  -     Lipid Panel    4. Essential hypertension  -     Comprehensive Metabolic Panel  -     TSH Rfx On Abnormal To Free T4  -     lisinopril (PRINIVIL,ZESTRIL) 20 MG tablet; Take " 1 tablet by mouth Daily.  Dispense: 90 tablet; Refill: 3    5. Anxiety (mild)   -     LORazepam (Ativan) 1 MG tablet; Take 1 tablet by mouth Daily As Needed for Anxiety.  Dispense: 12 tablet; Refill: 0          Follow Up     Return in about 1 year (around 10/17/2023).    Patient was given instructions and counseling regarding his condition or for health maintenance advice. Please see specific information pulled into the AVS if appropriate.

## 2022-10-17 NOTE — ASSESSMENT & PLAN NOTE
ADVICE GIVEN RE:  SEATBELT USE, ALCOHOL USE, HEALTHY DIET, ROUTINE EYE AND DENTAL EXAM, ROUTINE VACCINATIONS.    HE WILL GET HIS FLU SHOT LATER   
WDL

## 2023-09-26 ENCOUNTER — LAB (OUTPATIENT)
Dept: LAB | Facility: HOSPITAL | Age: 58
End: 2023-09-26
Payer: COMMERCIAL

## 2023-09-26 ENCOUNTER — OFFICE VISIT (OUTPATIENT)
Dept: FAMILY MEDICINE CLINIC | Age: 58
End: 2023-09-26
Payer: COMMERCIAL

## 2023-09-26 VITALS
BODY MASS INDEX: 21.13 KG/M2 | HEIGHT: 77 IN | WEIGHT: 179 LBS | DIASTOLIC BLOOD PRESSURE: 84 MMHG | HEART RATE: 65 BPM | SYSTOLIC BLOOD PRESSURE: 144 MMHG

## 2023-09-26 DIAGNOSIS — F41.9 ANXIETY: ICD-10-CM

## 2023-09-26 DIAGNOSIS — Z12.5 SCREENING FOR PROSTATE CANCER: ICD-10-CM

## 2023-09-26 DIAGNOSIS — Z00.00 ANNUAL PHYSICAL EXAM: Primary | ICD-10-CM

## 2023-09-26 DIAGNOSIS — I10 ESSENTIAL HYPERTENSION: ICD-10-CM

## 2023-09-26 DIAGNOSIS — Z13.220 SCREENING FOR LIPOID DISORDERS: ICD-10-CM

## 2023-09-26 DIAGNOSIS — Z11.59 ENCOUNTER FOR SCREENING FOR OTHER VIRAL DISEASES: ICD-10-CM

## 2023-09-26 LAB
ALBUMIN SERPL-MCNC: 5 G/DL (ref 3.5–5.2)
ALBUMIN/GLOB SERPL: 2.3 G/DL
ALP SERPL-CCNC: 54 U/L (ref 39–117)
ALT SERPL W P-5'-P-CCNC: 26 U/L (ref 1–41)
ANION GAP SERPL CALCULATED.3IONS-SCNC: 7.7 MMOL/L (ref 5–15)
AST SERPL-CCNC: 28 U/L (ref 1–40)
BILIRUB SERPL-MCNC: 0.6 MG/DL (ref 0–1.2)
BUN SERPL-MCNC: 16 MG/DL (ref 6–20)
BUN/CREAT SERPL: 19.5 (ref 7–25)
CALCIUM SPEC-SCNC: 9.9 MG/DL (ref 8.6–10.5)
CHLORIDE SERPL-SCNC: 101 MMOL/L (ref 98–107)
CHOLEST SERPL-MCNC: 179 MG/DL (ref 0–200)
CO2 SERPL-SCNC: 29.3 MMOL/L (ref 22–29)
CREAT SERPL-MCNC: 0.82 MG/DL (ref 0.76–1.27)
DEPRECATED RDW RBC AUTO: 38.7 FL (ref 37–54)
EGFRCR SERPLBLD CKD-EPI 2021: 101.2 ML/MIN/1.73
ERYTHROCYTE [DISTWIDTH] IN BLOOD BY AUTOMATED COUNT: 11.8 % (ref 12.3–15.4)
GLOBULIN UR ELPH-MCNC: 2.2 GM/DL
GLUCOSE SERPL-MCNC: 113 MG/DL (ref 65–99)
HCT VFR BLD AUTO: 46.4 % (ref 37.5–51)
HCV AB SER DONR QL: NORMAL
HDLC SERPL-MCNC: 44 MG/DL (ref 40–60)
HGB BLD-MCNC: 16 G/DL (ref 13–17.7)
LDLC SERPL CALC-MCNC: 122 MG/DL (ref 0–100)
LDLC/HDLC SERPL: 2.76 {RATIO}
MCH RBC QN AUTO: 30.5 PG (ref 26.6–33)
MCHC RBC AUTO-ENTMCNC: 34.5 G/DL (ref 31.5–35.7)
MCV RBC AUTO: 88.5 FL (ref 79–97)
PLATELET # BLD AUTO: 186 10*3/MM3 (ref 140–450)
PMV BLD AUTO: 9.3 FL (ref 6–12)
POTASSIUM SERPL-SCNC: 5.2 MMOL/L (ref 3.5–5.2)
PROT SERPL-MCNC: 7.2 G/DL (ref 6–8.5)
PSA SERPL-MCNC: 0.42 NG/ML (ref 0–4)
RBC # BLD AUTO: 5.24 10*6/MM3 (ref 4.14–5.8)
SODIUM SERPL-SCNC: 138 MMOL/L (ref 136–145)
TRIGL SERPL-MCNC: 67 MG/DL (ref 0–150)
TSH SERPL DL<=0.05 MIU/L-ACNC: 0.9 UIU/ML (ref 0.27–4.2)
VLDLC SERPL-MCNC: 13 MG/DL (ref 5–40)
WBC NRBC COR # BLD: 5.61 10*3/MM3 (ref 3.4–10.8)

## 2023-09-26 PROCEDURE — 80050 GENERAL HEALTH PANEL: CPT | Performed by: FAMILY MEDICINE

## 2023-09-26 PROCEDURE — 80061 LIPID PANEL: CPT | Performed by: FAMILY MEDICINE

## 2023-09-26 PROCEDURE — 99396 PREV VISIT EST AGE 40-64: CPT | Performed by: FAMILY MEDICINE

## 2023-09-26 PROCEDURE — 36415 COLL VENOUS BLD VENIPUNCTURE: CPT | Performed by: FAMILY MEDICINE

## 2023-09-26 PROCEDURE — 86803 HEPATITIS C AB TEST: CPT | Performed by: FAMILY MEDICINE

## 2023-09-26 PROCEDURE — G0103 PSA SCREENING: HCPCS | Performed by: FAMILY MEDICINE

## 2023-09-26 RX ORDER — LORAZEPAM 1 MG/1
1 TABLET ORAL DAILY PRN
Qty: 12 TABLET | Refills: 0 | Status: SHIPPED | OUTPATIENT
Start: 2023-09-26

## 2023-09-26 NOTE — ASSESSMENT & PLAN NOTE
STABLE ON CURRENT MEDICATION.  FABRICE REVIEWED..  THE CONTINUED PRN USE OF A CONTROLLED SUBSTANCE IS NECESSARY FOR THIS PATIENT TO HAVE A NEAR NORMAL QUALITY OF LIFE AND WILL BE REVIEWED AT THE NEXT ROUTINE VISIT.

## 2023-09-26 NOTE — PROGRESS NOTES
Chief Complaint  Annual Exam    Subjective          Dinesh Thornton presents to [facility]  History of Present Illness  --ANNUAL EXAM, LAST EXAM ONE YEAR AGO, DOES NOT SMOKE, COLONOSCOPY WAS IN 2014  --STILL TAKES AN OCCASIONAL ATIVAN FOR ANXIETY  --TOLERATING BLOOD PRESSURE MEDICATION WITHOUT APPARENT SIDE EFFECTS      No Known Allergies     Health Maintenance Due   Topic Date Due    COVID-19 Vaccine (1) Never done    ZOSTER VACCINE (1 of 2) Never done    HEPATITIS C SCREENING  Never done        Current Outpatient Medications on File Prior to Visit   Medication Sig    atenolol (Tenormin) 25 MG tablet Take 1 tablet by mouth Daily As Needed (ANXIETY).    Cyanocobalamin (VITAMIN B12 PO) Take  by mouth.    lisinopril (PRINIVIL,ZESTRIL) 20 MG tablet Take 1 tablet by mouth Daily.    metroNIDAZOLE (METROCREAM) 0.75 % cream Apply 1 application  topically to the appropriate area as directed 2 (Two) Times a Day.    multivitamin with minerals tablet tablet Take 1 tablet by mouth Daily.    [DISCONTINUED] LORazepam (Ativan) 1 MG tablet Take 1 tablet by mouth Daily As Needed for Anxiety.     No current facility-administered medications on file prior to visit.       Immunization History   Administered Date(s) Administered    Fluzone (or Fluarix & Flulaval for VFC) >6mos 10/15/2021    Influenza, Unspecified 10/17/2017       Review of Systems   Constitutional:  Negative for activity change, appetite change, chills, fatigue and fever.   HENT:  Negative for congestion, ear pain, hearing loss, rhinorrhea and sore throat.    Eyes:  Negative for blurred vision and discharge.   Respiratory:  Negative for cough and shortness of breath.    Cardiovascular:  Negative for chest pain, palpitations and leg swelling.   Gastrointestinal:  Negative for abdominal pain, constipation, diarrhea, nausea and vomiting.   Genitourinary:  Negative for dysuria and hematuria.   Musculoskeletal:  Negative for arthralgias and  "myalgias.   Neurological:  Negative for headache.   Psychiatric/Behavioral:  Negative for depressed mood.       Objective     /84 (BP Location: Left arm, Patient Position: Sitting)   Pulse 65   Ht 196.2 cm (77.25\")   Wt 81.2 kg (179 lb)   BMI 21.09 kg/m²       Physical Exam  Vitals and nursing note reviewed.   Constitutional:       General: He is not in acute distress.     Appearance: Normal appearance.   HENT:      Right Ear: Tympanic membrane normal.      Left Ear: Tympanic membrane normal.      Mouth/Throat:      Pharynx: Oropharynx is clear.   Eyes:      Conjunctiva/sclera: Conjunctivae normal.   Cardiovascular:      Rate and Rhythm: Normal rate and regular rhythm.      Heart sounds: Normal heart sounds. No murmur heard.  Pulmonary:      Effort: Pulmonary effort is normal.      Breath sounds: Normal breath sounds.   Abdominal:      General: Bowel sounds are normal.      Palpations: Abdomen is soft.      Tenderness: There is no abdominal tenderness.   Musculoskeletal:      Cervical back: Neck supple.      Right lower leg: No edema.      Left lower leg: No edema.   Lymphadenopathy:      Cervical: No cervical adenopathy.   Neurological:      General: No focal deficit present.      Mental Status: He is alert.      Cranial Nerves: No cranial nerve deficit.      Coordination: Coordination normal.      Gait: Gait normal.   Psychiatric:         Mood and Affect: Mood normal.         Behavior: Behavior normal.       Result Review :                             Assessment and Plan      Diagnoses and all orders for this visit:    1. Annual physical exam (Primary)  Assessment & Plan:  ADVICE GIVEN RE:  SEATBELT USE, ALCOHOL USE, HEALTHY DIET, ROUTINE EYE AND DENTAL EXAM, ROUTINE VACCINATIONS.        2. Essential hypertension  -     CBC (No Diff)  -     Comprehensive Metabolic Panel  -     TSH Rfx On Abnormal To Free T4    3. Anxiety (mild)   Assessment & Plan:  STABLE ON CURRENT MEDICATION.  FABRICE REVIEWED..  THE " CONTINUED PRN USE OF A CONTROLLED SUBSTANCE IS NECESSARY FOR THIS PATIENT TO HAVE A NEAR NORMAL QUALITY OF LIFE AND WILL BE REVIEWED AT THE NEXT ROUTINE VISIT.    Orders:  -     LORazepam (Ativan) 1 MG tablet; Take 1 tablet by mouth Daily As Needed for Anxiety.  Dispense: 12 tablet; Refill: 0    4. Screening for prostate cancer  -     PSA Screen    5. Screening for lipoid disorders  -     Lipid Panel    6. Encounter for screening for other viral diseases  -     Hepatitis C Antibody            Follow Up     Return in about 1 year (around 9/26/2024).    Patient was given instructions and counseling regarding his condition or for health maintenance advice. Please see specific information pulled into the AVS if appropriate.

## 2023-10-13 DIAGNOSIS — I10 ESSENTIAL HYPERTENSION: ICD-10-CM

## 2023-10-13 RX ORDER — LISINOPRIL 20 MG/1
20 TABLET ORAL DAILY
Qty: 90 TABLET | Refills: 3 | Status: SHIPPED | OUTPATIENT
Start: 2023-10-13

## 2024-09-25 DIAGNOSIS — I10 ESSENTIAL HYPERTENSION: ICD-10-CM

## 2024-09-26 RX ORDER — LISINOPRIL 20 MG/1
20 TABLET ORAL DAILY
Qty: 90 TABLET | Refills: 3 | Status: SHIPPED | OUTPATIENT
Start: 2024-09-26 | End: 2024-09-27 | Stop reason: SDUPTHER

## 2024-09-27 ENCOUNTER — OFFICE VISIT (OUTPATIENT)
Dept: FAMILY MEDICINE CLINIC | Age: 59
End: 2024-09-27
Payer: COMMERCIAL

## 2024-09-27 ENCOUNTER — LAB (OUTPATIENT)
Dept: LAB | Facility: HOSPITAL | Age: 59
End: 2024-09-27
Payer: COMMERCIAL

## 2024-09-27 VITALS
HEART RATE: 84 BPM | WEIGHT: 179.8 LBS | OXYGEN SATURATION: 97 % | DIASTOLIC BLOOD PRESSURE: 86 MMHG | TEMPERATURE: 98 F | BODY MASS INDEX: 21.23 KG/M2 | SYSTOLIC BLOOD PRESSURE: 129 MMHG | HEIGHT: 77 IN

## 2024-09-27 DIAGNOSIS — Z12.5 SCREENING FOR PROSTATE CANCER: ICD-10-CM

## 2024-09-27 DIAGNOSIS — Z13.220 SCREENING FOR LIPOID DISORDERS: ICD-10-CM

## 2024-09-27 DIAGNOSIS — Z00.00 ANNUAL PHYSICAL EXAM: Primary | ICD-10-CM

## 2024-09-27 DIAGNOSIS — Z12.11 COLON CANCER SCREENING: ICD-10-CM

## 2024-09-27 DIAGNOSIS — F41.9 ANXIETY: ICD-10-CM

## 2024-09-27 DIAGNOSIS — I10 ESSENTIAL HYPERTENSION: ICD-10-CM

## 2024-09-27 LAB
ALBUMIN SERPL-MCNC: 4.8 G/DL (ref 3.5–5.2)
ALBUMIN/GLOB SERPL: 1.8 G/DL
ALP SERPL-CCNC: 54 U/L (ref 39–117)
ALT SERPL W P-5'-P-CCNC: 27 U/L (ref 1–41)
ANION GAP SERPL CALCULATED.3IONS-SCNC: 9.1 MMOL/L (ref 5–15)
AST SERPL-CCNC: 30 U/L (ref 1–40)
BILIRUB SERPL-MCNC: 0.8 MG/DL (ref 0–1.2)
BUN SERPL-MCNC: 15 MG/DL (ref 8–23)
BUN/CREAT SERPL: 15.5 (ref 7–25)
CALCIUM SPEC-SCNC: 9.2 MG/DL (ref 8.6–10.5)
CHLORIDE SERPL-SCNC: 100 MMOL/L (ref 98–107)
CHOLEST SERPL-MCNC: 174 MG/DL (ref 0–200)
CO2 SERPL-SCNC: 28.9 MMOL/L (ref 22–29)
CREAT SERPL-MCNC: 0.97 MG/DL (ref 0.76–1.27)
DEPRECATED RDW RBC AUTO: 39.1 FL (ref 37–54)
EGFRCR SERPLBLD CKD-EPI 2021: 89.4 ML/MIN/1.73
ERYTHROCYTE [DISTWIDTH] IN BLOOD BY AUTOMATED COUNT: 11.8 % (ref 12.3–15.4)
GLOBULIN UR ELPH-MCNC: 2.6 GM/DL
GLUCOSE SERPL-MCNC: 99 MG/DL (ref 65–99)
HCT VFR BLD AUTO: 47.3 % (ref 37.5–51)
HDLC SERPL-MCNC: 42 MG/DL (ref 40–60)
HGB BLD-MCNC: 16.2 G/DL (ref 13–17.7)
LDLC SERPL CALC-MCNC: 120 MG/DL (ref 0–100)
LDLC/HDLC SERPL: 2.84 {RATIO}
MCH RBC QN AUTO: 30.3 PG (ref 26.6–33)
MCHC RBC AUTO-ENTMCNC: 34.2 G/DL (ref 31.5–35.7)
MCV RBC AUTO: 88.6 FL (ref 79–97)
PLATELET # BLD AUTO: 197 10*3/MM3 (ref 140–450)
PMV BLD AUTO: 9.4 FL (ref 6–12)
POTASSIUM SERPL-SCNC: 4.7 MMOL/L (ref 3.5–5.2)
PROT SERPL-MCNC: 7.4 G/DL (ref 6–8.5)
PSA SERPL-MCNC: 0.42 NG/ML (ref 0–4)
RBC # BLD AUTO: 5.34 10*6/MM3 (ref 4.14–5.8)
SODIUM SERPL-SCNC: 138 MMOL/L (ref 136–145)
TRIGL SERPL-MCNC: 63 MG/DL (ref 0–150)
TSH SERPL DL<=0.05 MIU/L-ACNC: 1.17 UIU/ML (ref 0.27–4.2)
VLDLC SERPL-MCNC: 12 MG/DL (ref 5–40)
WBC NRBC COR # BLD AUTO: 5.58 10*3/MM3 (ref 3.4–10.8)

## 2024-09-27 PROCEDURE — G0103 PSA SCREENING: HCPCS | Performed by: FAMILY MEDICINE

## 2024-09-27 PROCEDURE — 36415 COLL VENOUS BLD VENIPUNCTURE: CPT | Performed by: FAMILY MEDICINE

## 2024-09-27 PROCEDURE — 99396 PREV VISIT EST AGE 40-64: CPT | Performed by: FAMILY MEDICINE

## 2024-09-27 PROCEDURE — 80050 GENERAL HEALTH PANEL: CPT | Performed by: FAMILY MEDICINE

## 2024-09-27 PROCEDURE — 80061 LIPID PANEL: CPT | Performed by: FAMILY MEDICINE

## 2024-09-27 RX ORDER — ATENOLOL 25 MG/1
25 TABLET ORAL DAILY PRN
Qty: 30 TABLET | Refills: 0 | Status: SHIPPED | OUTPATIENT
Start: 2024-09-27

## 2024-09-27 RX ORDER — LORAZEPAM 1 MG/1
1 TABLET ORAL DAILY PRN
Qty: 12 TABLET | Refills: 0 | Status: SHIPPED | OUTPATIENT
Start: 2024-09-27

## 2024-09-27 RX ORDER — LISINOPRIL 20 MG/1
20 TABLET ORAL DAILY
Qty: 90 TABLET | Refills: 3 | Status: SHIPPED | OUTPATIENT
Start: 2024-09-27